# Patient Record
Sex: FEMALE | Race: WHITE
[De-identification: names, ages, dates, MRNs, and addresses within clinical notes are randomized per-mention and may not be internally consistent; named-entity substitution may affect disease eponyms.]

---

## 2017-07-12 ENCOUNTER — HOSPITAL ENCOUNTER (OUTPATIENT)
Dept: HOSPITAL 17 - HSDC | Age: 37
Discharge: HOME | End: 2017-07-12
Attending: UROLOGY
Payer: MEDICAID

## 2017-07-12 VITALS — BODY MASS INDEX: 24.79 KG/M2 | WEIGHT: 148.81 LBS | HEIGHT: 65 IN

## 2017-07-12 VITALS
SYSTOLIC BLOOD PRESSURE: 121 MMHG | HEART RATE: 68 BPM | OXYGEN SATURATION: 98 % | RESPIRATION RATE: 16 BRPM | DIASTOLIC BLOOD PRESSURE: 88 MMHG | TEMPERATURE: 97.7 F

## 2017-07-12 VITALS
OXYGEN SATURATION: 97 % | SYSTOLIC BLOOD PRESSURE: 135 MMHG | RESPIRATION RATE: 16 BRPM | HEART RATE: 70 BPM | TEMPERATURE: 98.4 F | DIASTOLIC BLOOD PRESSURE: 79 MMHG

## 2017-07-12 DIAGNOSIS — Z01.818: ICD-10-CM

## 2017-07-12 DIAGNOSIS — N20.0: Primary | ICD-10-CM

## 2017-07-12 LAB
BASOPHILS # BLD AUTO: 0 TH/MM3 (ref 0–0.2)
BASOPHILS NFR BLD: 0.5 % (ref 0–2)
EOSINOPHIL # BLD: 0.2 TH/MM3 (ref 0–0.4)
EOSINOPHIL NFR BLD: 3.3 % (ref 0–4)
ERYTHROCYTE [DISTWIDTH] IN BLOOD BY AUTOMATED COUNT: 13.3 % (ref 11.6–17.2)
HCT VFR BLD CALC: 39.3 % (ref 35–46)
HEMO FLAGS: (no result)
LYMPHOCYTES # BLD AUTO: 2.8 TH/MM3 (ref 1–4.8)
LYMPHOCYTES NFR BLD AUTO: 39.7 % (ref 9–44)
MCH RBC QN AUTO: 28.5 PG (ref 27–34)
MCHC RBC AUTO-ENTMCNC: 32.7 % (ref 32–36)
MCV RBC AUTO: 87 FL (ref 80–100)
MONOCYTES NFR BLD: 5.9 % (ref 0–8)
NEUTROPHILS # BLD AUTO: 3.5 TH/MM3 (ref 1.8–7.7)
NEUTROPHILS NFR BLD AUTO: 50.6 % (ref 16–70)
PLATELET # BLD: 249 TH/MM3 (ref 150–450)
RBC # BLD AUTO: 4.52 MIL/MM3 (ref 4–5.3)
WBC # BLD AUTO: 6.9 TH/MM3 (ref 4–11)

## 2017-07-12 PROCEDURE — 74000: CPT

## 2017-07-12 PROCEDURE — 85025 COMPLETE CBC W/AUTO DIFF WBC: CPT

## 2017-07-12 PROCEDURE — 50590 FRAGMENTING OF KIDNEY STONE: CPT

## 2017-07-12 PROCEDURE — 00872 ANES LITHOTRP ESW WATER BATH: CPT

## 2017-07-12 NOTE — PD.OP
__________________________________________________





Operative Report


Date of Surgery:  Jul 12, 2017


Preoperative Diagnosis:  


(1) Renal calculus, right


Postoperative Diagnosis:  


(1) Renal calculus, right


Procedure:


Extracorporeal shockwave lithotripsy right renal calculi


Anesthesia:


General


Surgeon:


Caleb Roberts


Assistant(s):


None


Operation and Findings:


Indication for procedure: Case of a pleasant 31 year old female with 2 right 

renal calculi who presents today to undergo extracorporeal shockwave 

lithotripsy.





Operative procedure in detail: Patient was brought to the operating room suite 

and placed supine on the lithotripsy table.  She was next placed under general 

anesthesia.  After appropriate timeout was undertaken I proceeded with 

localizing the patient's right mid to lower renal calculi with fluoroscopy.  

The patient subsequently received extracorporeal shockwave lithotripsy 

utilizing the [employer] Piezolith 3000 device.  The patient received a total of 3000 

shocks with a maximum tolerable setting of 20.  The stones appeared spread out 

and lighter intensity consistent with fragmentation.  She tolerated the 

procedure well and was transferred to the PACU in satisfactory condition.








Caleb Roberts MD Jul 12, 2017 10:43

## 2017-07-12 NOTE — RADRPT
EXAM DATE/TIME:  07/12/2017 07:52 

 

[facility] COMPARISON:     

No previous studies available for comparison.

 

                     

INDICATIONS :     

Pre op right ESWL today. Abdominal pain. 

                     

 

MEDICAL HISTORY :            

Kidney stones.    

 

SURGICAL HISTORY :        

Abdominoplasty. 

 

ENCOUNTER:     

Initial                                        

 

ACUITY:     

1 day      

 

PAIN SCORE:     

3/10

 

LOCATION:     

Right  abdomen. 

 

FINDINGS:     

Single view of the abdomen demonstrates 2 densely calcified stones overlying the lower pole the right
 kidney the largest measures 8 mm. The more inferior stone measures 6 mm. No stones are seen on the l
eft.

 

The bowel gas pattern is normal in appearance.

 

The osseous structures are intact.

 

CONCLUSION:     

1. There are 2 stones seen in the lower pole the right kidney as described above.

 

 

 

 Royer Seymour MD on July 12, 2017 at 8:52           

Board Certified Radiologist.

 This report was verified electronically.

## 2018-02-28 ENCOUNTER — HOSPITAL ENCOUNTER (OUTPATIENT)
Dept: HOSPITAL 17 - HSDC | Age: 38
Discharge: HOME | End: 2018-02-28
Attending: UROLOGY
Payer: COMMERCIAL

## 2018-02-28 VITALS
HEART RATE: 64 BPM | SYSTOLIC BLOOD PRESSURE: 116 MMHG | OXYGEN SATURATION: 100 % | RESPIRATION RATE: 16 BRPM | DIASTOLIC BLOOD PRESSURE: 83 MMHG | TEMPERATURE: 97.8 F

## 2018-02-28 VITALS — BODY MASS INDEX: 24.28 KG/M2 | HEIGHT: 65 IN | WEIGHT: 145.73 LBS

## 2018-02-28 DIAGNOSIS — Z01.818: ICD-10-CM

## 2018-02-28 DIAGNOSIS — N20.0: Primary | ICD-10-CM

## 2018-02-28 LAB
BASOPHILS # BLD AUTO: 0 TH/MM3 (ref 0–0.2)
BASOPHILS NFR BLD: 0.8 % (ref 0–2)
EOSINOPHIL # BLD: 0.1 TH/MM3 (ref 0–0.4)
EOSINOPHIL NFR BLD: 3.1 % (ref 0–4)
ERYTHROCYTE [DISTWIDTH] IN BLOOD BY AUTOMATED COUNT: 13.5 % (ref 11.6–17.2)
HCT VFR BLD CALC: 37.4 % (ref 35–46)
HGB BLD-MCNC: 13 GM/DL (ref 11.6–15.3)
LYMPHOCYTES # BLD AUTO: 1.6 TH/MM3 (ref 1–4.8)
LYMPHOCYTES NFR BLD AUTO: 34.8 % (ref 9–44)
MCH RBC QN AUTO: 29.6 PG (ref 27–34)
MCHC RBC AUTO-ENTMCNC: 34.8 % (ref 32–36)
MCV RBC AUTO: 85.1 FL (ref 80–100)
MONOCYTE #: 0.4 TH/MM3 (ref 0–0.9)
MONOCYTES NFR BLD: 8.1 % (ref 0–8)
NEUTROPHILS # BLD AUTO: 2.5 TH/MM3 (ref 1.8–7.7)
NEUTROPHILS NFR BLD AUTO: 53.2 % (ref 16–70)
PLATELET # BLD: 280 TH/MM3 (ref 150–450)
PMV BLD AUTO: 7.9 FL (ref 7–11)
RBC # BLD AUTO: 4.39 MIL/MM3 (ref 4–5.3)
WBC # BLD AUTO: 4.7 TH/MM3 (ref 4–11)

## 2018-02-28 PROCEDURE — 50590 FRAGMENTING OF KIDNEY STONE: CPT

## 2018-02-28 PROCEDURE — 85025 COMPLETE CBC W/AUTO DIFF WBC: CPT

## 2018-02-28 PROCEDURE — 00872 ANES LITHOTRP ESW WATER BATH: CPT

## 2018-02-28 PROCEDURE — 74018 RADEX ABDOMEN 1 VIEW: CPT

## 2018-02-28 NOTE — RADRPT
EXAM DATE/TIME:  02/28/2018 07:06 

 

[facility] COMPARISON:     

ABDOMEN KUB ONLY, July 12, 2017, 7:52.

 

                     

INDICATIONS :     

Right renal calculi. Pre-op right side lithotripsy.

                     

 

MEDICAL HISTORY :            

Kidney stones.   

 

SURGICAL HISTORY :        

Abdominoplasty.

 

ENCOUNTER:     

Initial                                        

 

ACUITY:     

1 day      

 

PAIN SCORE:     

3/10

 

LOCATION:       

abdomen

 

FINDINGS:     

Supine view of the abdomen was performed.  The abdominal bowel gas pattern is normal.  No abnormal ma
sses or organomegaly is seen. 2 right renal calculi are again identified measuring approximately 7-8 
mm. There are no left renal calculi or ureteral calculi. There are calcified phleboliths in the pelvi
s. The osseous structures are unremarkable.

 

CONCLUSION:     

2 right renal calculi again identified without significant change.

 

 

 

 Yoshi Quiroz MD on February 28, 2018 at 7:32           

Board Certified Radiologist.

 This report was verified electronically.

## 2018-02-28 NOTE — PD.OP
__________________________________________________





Operative Report


Date of Surgery:  Feb 28, 2018


Preoperative Diagnosis:  


(1) Renal calculus, right


Postoperative Diagnosis:  


(1) Renal calculus, right


Procedure:


Extracorporeal shockwave lithotripsy right renal calculus


Anesthesia:


General


Surgeon:


Caleb Roberts


Assistant(s):


None


Operation and Findings:


Indication for procedure: Case of a pleasant 37-year-old female with 2 right 

sided mid to lower pole renal calculi who presents today to undergo extra 

corporeal shockwave lithotripsy.





Operative procedure in detail: Patient was brought to the operating room suite 

and placed supine on the OR table.  She was then placed under general 

anesthesia.  After appropriate timeout was undertaken I proceeded with 

localizing the more superior of the 2 right mid to lower pole renal calculi.  

This stone measures approximately 8 mm in size.  I then proceeded with 

performing extracorporeal shockwave lithotripsy utilizing the [employer] Piezolith 

mobile lithotripsy device.  The patient received a total of 3000 shocks to the 

stone with a maximum power level setting of 20.  At the conclusion of the 

procedure the stone was a little bit lighter in intensity consistent with at 

least a partial degree of fragmentation.  The more inferior of the 2 renal 

calculi was not treated today.  The patient tolerated the procedure without 

complications and was transferred to the PACU in satisfactory condition.











Caleb Roberts MD Feb 28, 2018 09:51